# Patient Record
Sex: MALE | Race: WHITE | NOT HISPANIC OR LATINO | Employment: FULL TIME | URBAN - METROPOLITAN AREA
[De-identification: names, ages, dates, MRNs, and addresses within clinical notes are randomized per-mention and may not be internally consistent; named-entity substitution may affect disease eponyms.]

---

## 2017-10-23 ENCOUNTER — OFFICE VISIT (OUTPATIENT)
Dept: URGENT CARE | Facility: PHYSICIAN GROUP | Age: 27
End: 2017-10-23
Payer: COMMERCIAL

## 2017-10-23 VITALS
HEART RATE: 73 BPM | OXYGEN SATURATION: 98 % | TEMPERATURE: 97.9 F | WEIGHT: 264 LBS | RESPIRATION RATE: 16 BRPM | BODY MASS INDEX: 35.76 KG/M2 | SYSTOLIC BLOOD PRESSURE: 142 MMHG | HEIGHT: 72 IN | DIASTOLIC BLOOD PRESSURE: 108 MMHG

## 2017-10-23 DIAGNOSIS — J01.11 ACUTE RECURRENT FRONTAL SINUSITIS: Primary | ICD-10-CM

## 2017-10-23 PROCEDURE — 99213 OFFICE O/P EST LOW 20 MIN: CPT | Performed by: EMERGENCY MEDICINE

## 2017-10-23 RX ORDER — METHYLPREDNISOLONE 4 MG/1
TABLET ORAL
Qty: 1 KIT | Refills: 0 | Status: SHIPPED | OUTPATIENT
Start: 2017-10-23

## 2017-10-23 RX ORDER — FLUTICASONE PROPIONATE 50 MCG
1 SPRAY, SUSPENSION (ML) NASAL DAILY
COMMUNITY

## 2017-10-23 RX ORDER — LORATADINE 10 MG/1
10 TABLET ORAL DAILY
COMMUNITY

## 2017-10-23 RX ORDER — OLOPATADINE HYDROCHLORIDE 1 MG/ML
1 SOLUTION/ DROPS OPHTHALMIC 2 TIMES DAILY
Qty: 5 ML | Refills: 1 | Status: SHIPPED | OUTPATIENT
Start: 2017-10-23

## 2017-10-23 NOTE — PROGRESS NOTES
Subjective:      Georges Ibanez is a 27 y.o. male who presents with Nasal Congestion (allergies x3 weeks, worse past few days)            HPI  Patient patient with severe allergies for the past 3 weeks associated nasal congestion.Patient feels that this is seasonal but has not been recently tested in this locale. He has been tested in the past when he lived elsewhere. Patient complains of itchy eyes as well as runny nose with congestion.  No Known Allergies   Social History     Social History   • Marital status:      Spouse name: N/A   • Number of children: N/A   • Years of education: N/A     Occupational History   • Not on file.     Social History Main Topics   • Smoking status: Never Smoker   • Smokeless tobacco: Never Used   • Alcohol use Not on file   • Drug use: Unknown   • Sexual activity: Not on file     Other Topics Concern   • Not on file     Social History Narrative   • No narrative on file   History reviewed. No pertinent past medical history.   Current Outpatient Prescriptions on File Prior to Visit   Medication Sig Dispense Refill   • cephALEXin (KEFLEX) 500 MG Cap Take 1 Cap by mouth 3 times a day. 30 Cap 0     No current facility-administered medications on file prior to visit.    History reviewed. No pertinent family history.  Review of Systems   Constitutional: Negative for chills and fever.   HENT: Positive for congestion. Negative for sore throat.    Eyes: Positive for redness.   Respiratory: Negative for cough, hemoptysis, shortness of breath and stridor.    Cardiovascular: Negative for chest pain and palpitations.   Gastrointestinal: Negative for nausea and vomiting.   Musculoskeletal: Negative for neck pain.   Skin: Negative for rash.   Neurological: Negative for sensory change and speech change.   Psychiatric/Behavioral: The patient is not nervous/anxious.           Objective:     /108   Pulse 73   Temp 36.6 °C (97.9 °F)   Resp 16   Ht 1.829 m (6')   Wt 119.7 kg (264 lb)    SpO2 98%   BMI 35.80 kg/m²      Physical Exam   Constitutional: He appears well-developed and well-nourished. No distress.   HENT:   Head: Normocephalic and atraumatic.   Right Ear: External ear normal.   Left Ear: External ear normal.   Eyes: EOM are normal. Pupils are equal, round, and reactive to light. Right eye exhibits no discharge. Left eye exhibits no discharge.   Conjunctiva slightly inflamed. No exudates.   Neck: Normal range of motion.   Cardiovascular: Normal rate.    Pulmonary/Chest: No stridor. No respiratory distress. He has no wheezes. He has no rales.   Abdominal: He exhibits no distension. There is no tenderness.   Musculoskeletal: Normal range of motion.   Lymphadenopathy:     He has no cervical adenopathy.   Neurological: He is alert. No cranial nerve deficit.   Skin: Skin is warm. No rash noted. He is not diaphoretic. No erythema.   Psychiatric: He has a normal mood and affect. His behavior is normal.   Vitals reviewed.              Assessment/Plan:     Diagnosis allergic rhinitis/conjunctivitis      I am recommending the patient initiate/ continue hydration efforts including the use of a vaporizer/humidifier/ netti pot. I also recommend the pt, initiate Mucinex (if older than 4) Sudafed or Dayquil if not hypertensive. In addition the patient will initiate the prescribed prescription medication/s: Patanol all for his eyes Medrol Dosepak use as directed. If the patient's condition exacerbates with worsening dysphagia, shortness of breath, uncontrolled fever, headache or chest pressure he/she will return immediately to the urgent care or go to  the emergency department for further evaluation. Patient will follow up with PCP consider allergy testing. We will make sure he has the best filter is on a conditioning/heating system. I recommend something like [employer] filters.    Vernon Davis

## 2017-10-24 ASSESSMENT — ENCOUNTER SYMPTOMS
HEMOPTYSIS: 0
PALPITATIONS: 0
NERVOUS/ANXIOUS: 0
SPEECH CHANGE: 0
SHORTNESS OF BREATH: 0
NAUSEA: 0
COUGH: 0
NECK PAIN: 0
CHILLS: 0
STRIDOR: 0
EYE REDNESS: 1
SORE THROAT: 0
SENSORY CHANGE: 0
FEVER: 0
VOMITING: 0

## 2017-12-25 ENCOUNTER — OFFICE VISIT (OUTPATIENT)
Dept: URGENT CARE | Facility: PHYSICIAN GROUP | Age: 27
End: 2017-12-25
Payer: COMMERCIAL

## 2017-12-25 VITALS
SYSTOLIC BLOOD PRESSURE: 120 MMHG | TEMPERATURE: 99.3 F | DIASTOLIC BLOOD PRESSURE: 86 MMHG | WEIGHT: 249 LBS | HEIGHT: 72 IN | OXYGEN SATURATION: 93 % | HEART RATE: 110 BPM | RESPIRATION RATE: 14 BRPM | BODY MASS INDEX: 33.72 KG/M2

## 2017-12-25 DIAGNOSIS — Z20.828 EXPOSURE TO THE FLU: ICD-10-CM

## 2017-12-25 DIAGNOSIS — R68.89 FLU-LIKE SYMPTOMS: ICD-10-CM

## 2017-12-25 PROCEDURE — 99214 OFFICE O/P EST MOD 30 MIN: CPT | Performed by: FAMILY MEDICINE

## 2017-12-25 RX ORDER — GUAIFENESIN 600 MG/1
600 TABLET, EXTENDED RELEASE ORAL EVERY 12 HOURS
COMMUNITY

## 2017-12-25 RX ORDER — OSELTAMIVIR PHOSPHATE 75 MG/1
75 CAPSULE ORAL 2 TIMES DAILY
Qty: 10 CAP | Refills: 0 | Status: SHIPPED | OUTPATIENT
Start: 2017-12-25

## 2017-12-25 ASSESSMENT — ENCOUNTER SYMPTOMS
MYALGIAS: 1
FEVER: 1
COUGH: 1
CHILLS: 1

## 2017-12-25 ASSESSMENT — PAIN SCALES - GENERAL: PAINLEVEL: NO PAIN

## 2017-12-25 NOTE — PROGRESS NOTES
Subjective:   Georges Ibanez is a 27 y.o. male who presents for Flu Like Symptoms (x 24 hours)        Influenza   This is a new problem. The current episode started yesterday. The problem occurs constantly. The problem has been rapidly worsening. Associated symptoms include chills, coughing, a fever and myalgias.     Review of Systems   Constitutional: Positive for chills and fever.   Respiratory: Positive for cough.    Musculoskeletal: Positive for myalgias.     No Known Allergies   Objective:   /86   Pulse (!) 110   Temp 37.4 °C (99.3 °F)   Resp 14   Ht 1.829 m (6')   Wt 112.9 kg (249 lb)   SpO2 93%   BMI 33.77 kg/m²   Physical Exam   Constitutional: He is oriented to person, place, and time. He appears well-developed and well-nourished. No distress.   HENT:   Head: Normocephalic and atraumatic.   Mouth/Throat: Uvula is midline. Posterior oropharyngeal edema and posterior oropharyngeal erythema present. No oropharyngeal exudate or tonsillar abscesses.   Eyes: Conjunctivae and EOM are normal. Pupils are equal, round, and reactive to light.   Cardiovascular: Normal rate, regular rhythm and intact distal pulses.    No murmur heard.  Pulmonary/Chest: Effort normal and breath sounds normal. No respiratory distress.   Abdominal: Soft. He exhibits no distension. There is no tenderness.   Neurological: He is alert and oriented to person, place, and time. He has normal reflexes. No sensory deficit.   Skin: Skin is warm and dry.   Psychiatric: He has a normal mood and affect. His behavior is normal.   Vitals reviewed.        Assessment/Plan:   Assessment    1. Flu-like symptoms  Differential diagnosis, natural history, supportive care, and indications for immediate follow-up discussed.   - oseltamivir (TAMIFLU) 75 MG Cap; Take 1 Cap by mouth 2 times a day.  Dispense: 10 Cap; Refill: 0    2. Exposure to the flu

## 2019-11-19 ENCOUNTER — OFFICE VISIT (OUTPATIENT)
Dept: URGENT CARE | Facility: PHYSICIAN GROUP | Age: 29
End: 2019-11-19
Payer: COMMERCIAL

## 2019-11-19 VITALS
HEIGHT: 71 IN | HEART RATE: 92 BPM | TEMPERATURE: 98.2 F | WEIGHT: 214 LBS | OXYGEN SATURATION: 98 % | DIASTOLIC BLOOD PRESSURE: 68 MMHG | BODY MASS INDEX: 29.96 KG/M2 | SYSTOLIC BLOOD PRESSURE: 122 MMHG | RESPIRATION RATE: 18 BRPM

## 2019-11-19 DIAGNOSIS — B34.9 VIRAL ILLNESS: ICD-10-CM

## 2019-11-19 LAB
FLUAV+FLUBV AG SPEC QL IA: NEGATIVE
INT CON NEG: NEGATIVE
INT CON POS: POSITIVE

## 2019-11-19 PROCEDURE — 87804 INFLUENZA ASSAY W/OPTIC: CPT | Performed by: FAMILY MEDICINE

## 2019-11-19 PROCEDURE — 99213 OFFICE O/P EST LOW 20 MIN: CPT | Performed by: FAMILY MEDICINE

## 2019-11-19 SDOH — HEALTH STABILITY: MENTAL HEALTH: HOW OFTEN DO YOU HAVE A DRINK CONTAINING ALCOHOL?: NEVER

## 2019-11-19 ASSESSMENT — ENCOUNTER SYMPTOMS
MYALGIAS: 0
DIZZINESS: 0
SHORTNESS OF BREATH: 0
SORE THROAT: 0
FEVER: 0
EYE PAIN: 0
CHILLS: 0
VOMITING: 0
NAUSEA: 0

## 2019-11-19 NOTE — LETTER
November 19, 2019         Patient: Georges Ibanez   YOB: 1990   Date of Visit: 11/19/2019           To Whom it May Concern:    Georges Ibanez was seen in my clinic on 11/19/2019. He may return to work on 11/21/2019..    If you have any questions or concerns, please don't hesitate to call.        Sincerely,           Clark Kent M.D.  Electronically Signed

## 2019-11-20 NOTE — PROGRESS NOTES
"Subjective:   Georges Ibanez is a 29 y.o. male who presents for Sinus Problem (x this morning, abd pain and body aches )        29-year-old male presents the urgent care with chief complaint of body aches, myalgia, upset stomach, loose stools onset today and is sudden onset.  Complains of subjective fever and mild chills.  Patient denies flu vaccination this season.    Sinus Problem   Pertinent negatives include no chills, shortness of breath or sore throat.     Review of Systems   Constitutional: Negative for chills and fever.   HENT: Negative for sore throat.    Eyes: Negative for pain.   Respiratory: Negative for shortness of breath.    Cardiovascular: Negative for chest pain.   Gastrointestinal: Negative for nausea and vomiting.   Genitourinary: Negative for hematuria.   Musculoskeletal: Negative for myalgias.   Skin: Negative for rash.   Neurological: Negative for dizziness.     No Known Allergies   Objective:   /68   Pulse 92   Temp 36.8 °C (98.2 °F) (Temporal)   Resp 18   Ht 1.803 m (5' 11\")   Wt 97.1 kg (214 lb)   SpO2 98%   BMI 29.85 kg/m²   Physical Exam  Constitutional:       General: He is not in acute distress.     Appearance: He is well-developed.   HENT:      Head: Normocephalic and atraumatic.      Nose: Mucosal edema and rhinorrhea present.      Right Turbinates: Swollen.      Left Turbinates: Swollen.      Mouth/Throat:      Pharynx: Posterior oropharyngeal erythema present.   Eyes:      Conjunctiva/sclera: Conjunctivae normal.      Pupils: Pupils are equal, round, and reactive to light.   Cardiovascular:      Rate and Rhythm: Normal rate and regular rhythm.      Heart sounds: No murmur.   Pulmonary:      Effort: Pulmonary effort is normal. No respiratory distress.      Breath sounds: Normal breath sounds.   Abdominal:      General: There is no distension.      Palpations: Abdomen is soft.      Tenderness: There is no tenderness.   Musculoskeletal: Normal range of motion.   Skin:   "   General: Skin is warm and dry.   Neurological:      General: No focal deficit present.      Mental Status: He is alert and oriented to person, place, and time. Mental status is at baseline.      Gait: Gait (gait at baseline) normal.   Psychiatric:         Judgment: Judgment normal.     POCT influenza: negative         Assessment/Plan:   1. Viral illness  - POCT Influenza A/B    Differential diagnosis, natural history, supportive care, and indications for immediate follow-up discussed.     Advised the patient to follow-up with the primary care physician for recheck, reevaluation, and consideration of further management.

## 2023-04-20 RX ORDER — TRAMADOL HYDROCHLORIDE 50 MG/1
1 TABLET ORAL EVERY 12 HOURS PRN
COMMUNITY
Start: 2023-04-17 | End: 2023-05-02

## 2023-04-20 RX ORDER — ONDANSETRON 4 MG/1
1 TABLET, FILM COATED ORAL EVERY 8 HOURS PRN
COMMUNITY
Start: 2023-04-17

## 2023-04-20 RX ORDER — HYDROCODONE BITARTRATE AND ACETAMINOPHEN 5; 325 MG/1; MG/1
1-2 TABLET ORAL EVERY 6 HOURS PRN
COMMUNITY
Start: 2023-04-19

## 2023-04-23 ENCOUNTER — TELEMEDICINE (OUTPATIENT)
Dept: TELEHEALTH | Age: 33
End: 2023-04-23

## 2023-04-23 DIAGNOSIS — Z01.818 PREOPERATIVE CLEARANCE: Primary | ICD-10-CM

## 2023-04-23 NOTE — PROGRESS NOTES
Patient scheduled a Video Visit on Demand for a COVID test order. I called the patient who explained that he is scheduled for a microdiscectomy on 4/26/23 and was told that he would be contacted to have a COVID test performed within 72 hours before his Surgery. Since this was within that timeframe, the patient states he became concerned because he was never contacted. Patient does not anticipate being admitted after the procedure. I advised the patient that we have been updated on the Hospital's COVID testing policies prior to outpatient surgeries and this is no longer routinely required. Patient is concerned because he was specifically told by the office that he would need COVID testing. Patient does not want to chance jeopardizing his chance for the Surgery to take place on 4/26/23. We agreed that I would place the order which the patient could schedule through Central Scheduling tomorrow if he did not receive confirmation from the Surgeon's office that this was not going to be required. I advised that patient that I would not charge him for a Video Visit under the circumstances. COVID ([facility]) PCR test ordered.

## 2023-04-25 ENCOUNTER — ANESTHESIA EVENT (OUTPATIENT)
Dept: SURGERY | Facility: HOSPITAL | Age: 33
End: 2023-04-25
Payer: COMMERCIAL

## 2023-04-26 ENCOUNTER — APPOINTMENT (OUTPATIENT)
Dept: GENERAL RADIOLOGY | Facility: HOSPITAL | Age: 33
End: 2023-04-26
Attending: ORTHOPAEDIC SURGERY
Payer: COMMERCIAL

## 2023-04-26 ENCOUNTER — HOSPITAL ENCOUNTER (OUTPATIENT)
Facility: HOSPITAL | Age: 33
Setting detail: HOSPITAL OUTPATIENT SURGERY
Discharge: HOME | End: 2023-04-26
Attending: ORTHOPAEDIC SURGERY | Admitting: ORTHOPAEDIC SURGERY
Payer: COMMERCIAL

## 2023-04-26 ENCOUNTER — ANESTHESIA (OUTPATIENT)
Dept: SURGERY | Facility: HOSPITAL | Age: 33
End: 2023-04-26
Payer: COMMERCIAL

## 2023-04-26 VITALS
HEIGHT: 72 IN | HEART RATE: 93 BPM | OXYGEN SATURATION: 96 % | BODY MASS INDEX: 35.21 KG/M2 | DIASTOLIC BLOOD PRESSURE: 87 MMHG | SYSTOLIC BLOOD PRESSURE: 138 MMHG | TEMPERATURE: 98 F | RESPIRATION RATE: 16 BRPM | WEIGHT: 260 LBS

## 2023-04-26 PROCEDURE — 88311 DECALCIFY TISSUE: CPT | Performed by: ORTHOPAEDIC SURGERY

## 2023-04-26 PROCEDURE — 0ST40ZZ RESECTION OF LUMBOSACRAL DISC, OPEN APPROACH: ICD-10-PCS | Performed by: ORTHOPAEDIC SURGERY

## 2023-04-26 PROCEDURE — 88304 TISSUE EXAM BY PATHOLOGIST: CPT | Performed by: ORTHOPAEDIC SURGERY

## 2023-04-26 PROCEDURE — 72020 X-RAY EXAM OF SPINE 1 VIEW: CPT | Performed by: ORTHOPAEDIC SURGERY

## 2023-04-26 DEVICE — PATCH DURAL DURAMATRIX 1X1: Type: IMPLANTABLE DEVICE | Site: BACK | Status: FUNCTIONAL

## 2023-04-26 RX ORDER — CEFAZOLIN SODIUM/WATER 2 G/20 ML
2 SYRINGE (ML) INTRAVENOUS ONCE
Status: COMPLETED | OUTPATIENT
Start: 2023-04-26 | End: 2023-04-26

## 2023-04-26 RX ORDER — GLYCOPYRROLATE 0.2 MG/ML
INJECTION, SOLUTION INTRAMUSCULAR; INTRAVENOUS AS NEEDED
Status: DISCONTINUED | OUTPATIENT
Start: 2023-04-26 | End: 2023-04-26 | Stop reason: SURG

## 2023-04-26 RX ORDER — ONDANSETRON 2 MG/ML
4 INJECTION INTRAMUSCULAR; INTRAVENOUS EVERY 6 HOURS PRN
Status: DISCONTINUED | OUTPATIENT
Start: 2023-04-26 | End: 2023-04-26

## 2023-04-26 RX ORDER — NALOXONE HYDROCHLORIDE 0.4 MG/ML
80 INJECTION, SOLUTION INTRAMUSCULAR; INTRAVENOUS; SUBCUTANEOUS AS NEEDED
Status: DISCONTINUED | OUTPATIENT
Start: 2023-04-26 | End: 2023-04-26

## 2023-04-26 RX ORDER — DEXAMETHASONE SODIUM PHOSPHATE 4 MG/ML
VIAL (ML) INJECTION AS NEEDED
Status: DISCONTINUED | OUTPATIENT
Start: 2023-04-26 | End: 2023-04-26 | Stop reason: SURG

## 2023-04-26 RX ORDER — MIDAZOLAM HYDROCHLORIDE 1 MG/ML
1 INJECTION INTRAMUSCULAR; INTRAVENOUS EVERY 5 MIN PRN
Status: DISCONTINUED | OUTPATIENT
Start: 2023-04-26 | End: 2023-04-26

## 2023-04-26 RX ORDER — HYDROCODONE BITARTRATE AND ACETAMINOPHEN 5; 325 MG/1; MG/1
1 TABLET ORAL ONCE AS NEEDED
Status: COMPLETED | OUTPATIENT
Start: 2023-04-26 | End: 2023-04-26

## 2023-04-26 RX ORDER — ROCURONIUM BROMIDE 10 MG/ML
INJECTION, SOLUTION INTRAVENOUS AS NEEDED
Status: DISCONTINUED | OUTPATIENT
Start: 2023-04-26 | End: 2023-04-26 | Stop reason: SURG

## 2023-04-26 RX ORDER — ONDANSETRON 2 MG/ML
INJECTION INTRAMUSCULAR; INTRAVENOUS AS NEEDED
Status: DISCONTINUED | OUTPATIENT
Start: 2023-04-26 | End: 2023-04-26 | Stop reason: SURG

## 2023-04-26 RX ORDER — ONDANSETRON 2 MG/ML
4 INJECTION INTRAMUSCULAR; INTRAVENOUS ONCE
Status: COMPLETED | OUTPATIENT
Start: 2023-04-26 | End: 2023-04-26

## 2023-04-26 RX ORDER — TIZANIDINE 2 MG/1
2 TABLET ORAL EVERY 6 HOURS PRN
COMMUNITY
Start: 2023-04-18

## 2023-04-26 RX ORDER — PROCHLORPERAZINE EDISYLATE 5 MG/ML
5 INJECTION INTRAMUSCULAR; INTRAVENOUS EVERY 8 HOURS PRN
Status: DISCONTINUED | OUTPATIENT
Start: 2023-04-26 | End: 2023-04-26

## 2023-04-26 RX ORDER — CELECOXIB 200 MG/1
200 CAPSULE ORAL ONCE
Status: COMPLETED | OUTPATIENT
Start: 2023-04-26 | End: 2023-04-26

## 2023-04-26 RX ORDER — SODIUM CHLORIDE, SODIUM LACTATE, POTASSIUM CHLORIDE, CALCIUM CHLORIDE 600; 310; 30; 20 MG/100ML; MG/100ML; MG/100ML; MG/100ML
INJECTION, SOLUTION INTRAVENOUS CONTINUOUS
Status: DISCONTINUED | OUTPATIENT
Start: 2023-04-26 | End: 2023-04-26

## 2023-04-26 RX ORDER — HYDROMORPHONE HYDROCHLORIDE 1 MG/ML
0.4 INJECTION, SOLUTION INTRAMUSCULAR; INTRAVENOUS; SUBCUTANEOUS EVERY 5 MIN PRN
Status: DISCONTINUED | OUTPATIENT
Start: 2023-04-26 | End: 2023-04-26

## 2023-04-26 RX ORDER — ACETAMINOPHEN 500 MG
1000 TABLET ORAL ONCE AS NEEDED
Status: COMPLETED | OUTPATIENT
Start: 2023-04-26 | End: 2023-04-26

## 2023-04-26 RX ORDER — CEFAZOLIN SODIUM/WATER 2 G/20 ML
SYRINGE (ML) INTRAVENOUS
Status: DISCONTINUED
Start: 2023-04-26 | End: 2023-04-26

## 2023-04-26 RX ORDER — SCOLOPAMINE TRANSDERMAL SYSTEM 1 MG/1
1 PATCH, EXTENDED RELEASE TRANSDERMAL ONCE
Status: DISCONTINUED | OUTPATIENT
Start: 2023-04-26 | End: 2023-04-26 | Stop reason: HOSPADM

## 2023-04-26 RX ORDER — HYDROMORPHONE HYDROCHLORIDE 1 MG/ML
0.6 INJECTION, SOLUTION INTRAMUSCULAR; INTRAVENOUS; SUBCUTANEOUS EVERY 5 MIN PRN
Status: DISCONTINUED | OUTPATIENT
Start: 2023-04-26 | End: 2023-04-26

## 2023-04-26 RX ORDER — PROCHLORPERAZINE EDISYLATE 5 MG/ML
INJECTION INTRAMUSCULAR; INTRAVENOUS
Status: COMPLETED
Start: 2023-04-26 | End: 2023-04-26

## 2023-04-26 RX ORDER — LIDOCAINE HYDROCHLORIDE 10 MG/ML
INJECTION, SOLUTION EPIDURAL; INFILTRATION; INTRACAUDAL; PERINEURAL AS NEEDED
Status: DISCONTINUED | OUTPATIENT
Start: 2023-04-26 | End: 2023-04-26 | Stop reason: SURG

## 2023-04-26 RX ORDER — HYDROCODONE BITARTRATE AND ACETAMINOPHEN 5; 325 MG/1; MG/1
2 TABLET ORAL ONCE AS NEEDED
Status: COMPLETED | OUTPATIENT
Start: 2023-04-26 | End: 2023-04-26

## 2023-04-26 RX ORDER — ACETAMINOPHEN 500 MG
1000 TABLET ORAL ONCE
Status: DISCONTINUED | OUTPATIENT
Start: 2023-04-26 | End: 2023-04-26 | Stop reason: HOSPADM

## 2023-04-26 RX ORDER — MEPERIDINE HYDROCHLORIDE 25 MG/ML
12.5 INJECTION INTRAMUSCULAR; INTRAVENOUS; SUBCUTANEOUS AS NEEDED
Status: DISCONTINUED | OUTPATIENT
Start: 2023-04-26 | End: 2023-04-26

## 2023-04-26 RX ORDER — HYDROMORPHONE HYDROCHLORIDE 1 MG/ML
0.2 INJECTION, SOLUTION INTRAMUSCULAR; INTRAVENOUS; SUBCUTANEOUS EVERY 5 MIN PRN
Status: DISCONTINUED | OUTPATIENT
Start: 2023-04-26 | End: 2023-04-26

## 2023-04-26 RX ORDER — DIPHENHYDRAMINE HYDROCHLORIDE 50 MG/ML
12.5 INJECTION INTRAMUSCULAR; INTRAVENOUS AS NEEDED
Status: DISCONTINUED | OUTPATIENT
Start: 2023-04-26 | End: 2023-04-26

## 2023-04-26 RX ORDER — LIDOCAINE HYDROCHLORIDE 40 MG/ML
INJECTION, SOLUTION RETROBULBAR; TOPICAL AS NEEDED
Status: DISCONTINUED | OUTPATIENT
Start: 2023-04-26 | End: 2023-04-26 | Stop reason: SURG

## 2023-04-26 RX ORDER — NEOSTIGMINE METHYLSULFATE 1 MG/ML
INJECTION, SOLUTION INTRAVENOUS AS NEEDED
Status: DISCONTINUED | OUTPATIENT
Start: 2023-04-26 | End: 2023-04-26 | Stop reason: SURG

## 2023-04-26 RX ORDER — MELOXICAM 15 MG/1
TABLET ORAL
COMMUNITY
Start: 2023-04-25

## 2023-04-26 RX ORDER — ONDANSETRON 2 MG/ML
INJECTION INTRAMUSCULAR; INTRAVENOUS
Status: COMPLETED
Start: 2023-04-26 | End: 2023-04-26

## 2023-04-26 RX ORDER — CELECOXIB 200 MG/1
CAPSULE ORAL
Status: COMPLETED
Start: 2023-04-26 | End: 2023-04-26

## 2023-04-26 RX ADMIN — CEFAZOLIN SODIUM/WATER 2 G: 2 G/20 ML SYRINGE (ML) INTRAVENOUS at 13:12:00

## 2023-04-26 RX ADMIN — LIDOCAINE HYDROCHLORIDE 50 MG: 10 INJECTION, SOLUTION EPIDURAL; INFILTRATION; INTRACAUDAL; PERINEURAL at 12:58:00

## 2023-04-26 RX ADMIN — SODIUM CHLORIDE, SODIUM LACTATE, POTASSIUM CHLORIDE, CALCIUM CHLORIDE: 600; 310; 30; 20 INJECTION, SOLUTION INTRAVENOUS at 12:52:00

## 2023-04-26 RX ADMIN — NEOSTIGMINE METHYLSULFATE 3 MG: 1 INJECTION, SOLUTION INTRAVENOUS at 14:10:00

## 2023-04-26 RX ADMIN — GLYCOPYRROLATE 0.4 MG: 0.2 INJECTION, SOLUTION INTRAMUSCULAR; INTRAVENOUS at 14:10:00

## 2023-04-26 RX ADMIN — DEXAMETHASONE SODIUM PHOSPHATE 8 MG: 4 MG/ML VIAL (ML) INJECTION at 13:03:00

## 2023-04-26 RX ADMIN — LIDOCAINE HYDROCHLORIDE 4 ML: 40 INJECTION, SOLUTION RETROBULBAR; TOPICAL at 12:59:00

## 2023-04-26 RX ADMIN — ONDANSETRON 4 MG: 2 INJECTION INTRAMUSCULAR; INTRAVENOUS at 13:17:00

## 2023-04-26 RX ADMIN — SODIUM CHLORIDE, SODIUM LACTATE, POTASSIUM CHLORIDE, CALCIUM CHLORIDE: 600; 310; 30; 20 INJECTION, SOLUTION INTRAVENOUS at 14:11:00

## 2023-04-26 RX ADMIN — ROCURONIUM BROMIDE 50 MG: 10 INJECTION, SOLUTION INTRAVENOUS at 12:58:00

## 2023-04-26 NOTE — H&P
Agree with below. Patient would like to proceed with surgery. Raymundo Bundy PA-C  [address] [facility]-Internal Medicine  Attending Physician Office Visit Note    Chief Complaint   Patient presents with:  Pre-Op Exam: Lumbar 5- sacral 1 microdiscectomy with Dr. Jenniffer Painter on 4/26/23    History of Present Illness:  Monica Carr is 27year old male with a past medical history as below who presents with the following:     No reactions to anesthesia in past. No known significant reactions to anesthesia in her family. No VTE or in VTE in family. No easy bleeding or bruising. No angina or chest pain, SOB, dyspnea on exertion, syncope, pre-syncope, palpitations, orthopnea, PND, leg swelling. Exercise tolerance 2+ blocks without stopping. Elevated BP: /100, likely due to pain    No fevers, chills, headache, vision changes, double vision, dizziness, pre-syncope, CP, SOB, abd pain, NV, diarrhea, melena, hematochezia, hematuria, dysuria. Medications:  HYDROcodone-acetaminophen 5-325 MG Oral Tab, Take 1-2 tablets by mouth every 6 (six) hours as needed for Pain., Disp: 30 tablet, Rfl: 0  tiZANidine 2 MG Oral Tab, Take 1 tablet (2 mg total) by mouth every 6 (six) hours as needed. , Disp: 30 tablet, Rfl: 0  traMADol 50 MG Oral Tab, Take 1 tablet (50 mg total) by mouth every 12 (twelve) hours as needed for Pain., Disp: 30 tablet, Rfl: 0  ondansetron (ZOFRAN) 4 mg tablet, Take 1 tablet (4 mg total) by mouth every 8 (eight) hours as needed for Nausea., Disp: 20 tablet, Rfl: 0  Fexofenadine HCl (ALLEGRA OR), , Disp: , Rfl:   Meloxicam 15 MG Oral Tab, Take 1 tablet (15 mg total) by mouth daily. (Patient not taking: Reported on 4/20/2023), Disp: 30 tablet, Rfl: 1    No current facility-administered medications on file prior to visit. Problem List:  There is no problem list on file for this patient. Past Medical History:  No past medical history on file.     Past Surgical History:  No past surgical history on file. Allergies:  No Known Allergies    Social History:  Social History  Socioeconomic History  Marital status:   Tobacco Use  Smoking status: Never  Smokeless tobacco: Never  Vaping Use  Vaping Use: Never used  Substance and Sexual Activity  Alcohol use: Not Currently  Drug use: Never  Sexual activity: Yes      Family History:  No family history on file. Review of Systems:  General: Denies fevers or weight loss  HENT: Denies headache, hearing changes, or sore throat  Eyes: Denies any vision changes  Lungs: Denies any cough, shortness of breath, or wheezing  Heart: Denies any palpitations or chest pain. Denies orthopnea or leg swelling. Abdomen: Denies any abdominal pain, nausea, vomiting, diarrhea, constipation, or bloody/melena stools. Gu: Denies any pain or difficulty with urination  MSK: Denies any joint or muscle pain  Skin: Denies any rashes  Neuro: Denies any focal weakness or numbness/tingling  Psych: Denies any HI/SI.      Physical Exam:  04/20/23  0947   BP: (!) 134/100   Pulse: 85   SpO2: 96%   Weight: 255 lb (115.7 kg)   Height: 72\"     General: NAD, appears stated age, appears in pain, wheel chair bound  HENT: NCAT  Eyes: Normal conjunctiva, no scleral icterus  Lungs: CTAB, not in respiratory distress  Heart: RRR, S1/S2 present, no murmurs, no LE edema  MSK: Full ROM in UE and LE  Neuro: A&Ox3, moving all extremities  Skin: No rashes on exposed areas  Psych: Appropriate affect, euthymic    Labs/Imaging:  CBC: No results found for: [facility] results found for: HEMOGLOBIN, HGB No results found for: PLT   BMP: No results found for: GLUCOSENo results found for: KNo results found for: BUNNo results found for: CREATSERUM  Cholesterol: No results found for: CHOLESTNo results found for: HDLNo results found for: TRIG, TRIGLYNo results found for: LDLNo results found for: ASTNo results found for: ALT   Diabetes: No results found for: A1C, EAGNo results found for: CHOLESTNo results found for: HDLNo results found for: LDLNo results found for: Lenette Jaquan results found for: ASTNo results found for: ALT   Micro Albumen/Creatinine: No results found for: MICROALBCREA, MALBCREACALC   No results found for: PT, INR  INR: No results found for: INR  Ua: Imaging:  XR LUMBAR SPINE (MIN 4 VIEWS) (CPT=72110)    Result Date: 3/22/2023  IMPRESSION: 1. Mild dextrocurvature of the lumbar spine and straightening of normal lumbar lordosis, with an age-indeterminate superior L1 compression deformity (25% loss of vertebral body height) and mild loss of disc height at L5-S1. 2. Mild L5-S1 facet arthropathy and bilateral SI joint arthritis withOUT aggressive osseous lesions or pars defects. 3. Recommend further assessment with MR lumbar spine. MRI SPINE LUMBAR (CPT=72148)    Result Date: 3/30/2023  IMPRESSION: Disc herniation at L5-S1 with mass effect on the traversing left S1 nerve root. Correlate for corresponding radiculopathy. Other minimal degenerative changes as above. Minimal anterior wedging of the L1 vertebral body is likely degenerative. Assessment and Plan:  Lizzy Collazo is 27year old male with a past medical history as above who presents with the followin. Preoperative cardiovascular examination  -risk stratification below  - COMPREHENSIVE METABOLIC PANEL; Future  - COMPLETE BLOOD COUNT (CBC) WITHOUT DIFFERENTIAL; Future  - PT AND PTT; Future  - URINALYSIS, COMPLETE WITH MICROSCOPIC EXAMINATION WITH REFLEX TO URINE CULTURE, ROUTINE; Future  - ELECTROCARDIOGRAM, COMPLETE  - MRSA / MSSA PRE-OP; Future; this is medically necessary  - OFFICE CONSULTATION,LEVEL III    2. Primary hypertension  -/100, low salt diet, likely pain is contributing  -diet controlled    3.  Class 1 obesity due to excess calories with serious comorbidity and body mass index (BMI) of 34.0 to 34.9 in adult  -discuss diet and exercise    Procedure: Lumbar 5- sacral 1 microdiscectomy   Cardiac Risk:  Patient is low risk (RCRI = 0) for intermediate risk procedure. No chest pain or symptoms concerning for ACS. Functional Capacity is 4+ METs   EKG shows: NSR, borderline LVH by lead I, no specific ST     VTE risk: CAPRINI score: 3 (moderate): SCDs post-operatively, resume chemo VTE prophylaxis when able    Pulmonary Risk: ARISCAT 0 (low risk)  -perioperative pulmonary complications including hypoxemia, atelectasis, pneumonia, respiratory failure. -frequent incentive spirometry post-operatively as tolerated. FARHAT risk: FARHAT leads to possible difficult intubation risk and respiratory failure. STOPBANG 4 (intermediate risk); avoid oversedating medications    Renal: no sig hx of renal dysfunction. CMP ordered. Heme: no sig hx of significant anemia. CBC ordered. Endo: no hx of diabetes. Medication management: hold Aspirin 7 days prior to surgery. Hold ACE and ARB day of surgery. Continue rest of medications. Per ACC/ AHA guidelines, this patient may proceed to surgery without further risk stratification. I reviewed the chart, history, patient symptoms, underlying risk factors, medical conditions, diagnosis, formulation of treatment plan, and initiation of medical therapy during this visit. Prescription drug management was provided during this visit. Education was provided and was given to patient in the After Visit Summary.     Katya Cee DO

## 2023-04-26 NOTE — BRIEF OP NOTE
Pre-Operative Diagnosis: DISC HERNIATION     Post-Operative Diagnosis: One [facility] HERNIATION      Procedure Performed:   LEFT MICRODISCECTOMY LUMBAR 5 - SACRUM 1    Surgeon(s) and Role:     * Ed Zabala MD - Primary    Assistant(s):  PA: BLANCA Ornelas     Surgical Findings: above     Specimen: L5-S1 HNp     Estimated Blood Loss: Blood Output: 20 mL (4/26/2023  1:55 PM)      Dictation Number:      BLANCA Rodriguez  4/26/2023  2:12 PM

## 2023-04-26 NOTE — ANESTHESIA PROCEDURE NOTES
Airway  Date/Time: 4/26/2023 1:00 PM  Urgency: elective      General Information and Staff    Patient location during procedure: OR  Anesthesiologist: William Cardoza MD  Resident/CRNA: Ean Engle CRNA  Performed: CRNA   Performed by: Ean Engle CRNA  Authorized by: William Cardoza MD      Indications and Patient Condition  Indications for airway management: anesthesia  Sedation level: deep  Preoxygenated: yes  Patient position: sniffing  Mask difficulty assessment: 1 - vent by mask    Final Airway Details  Final airway type: endotracheal airway      Successful airway: ETT  Cuffed: yes   Successful intubation technique: direct laryngoscopy  Endotracheal tube insertion site: oral  Blade: GlideScope  Blade size: #4  ETT size (mm): 7.5    Cormack-Lehane Classification: grade I - full view of glottis  Placement verified by: chest auscultation and capnometry   Measured from: lips  ETT to lips (cm): 23  Number of attempts at approach: 1

## 2023-04-26 NOTE — ANESTHESIA POSTPROCEDURE EVALUATION
[address] Patient Status:  Hospital Outpatient Surgery   Age/Gender 27year old male MRN [id number]   Location [facility] Attending Ernst Longoria MD   Hosp Day # 0 PCP Armando Duran DO       Anesthesia Post-op Note    LEFT MICRODISCECTOMY LUMBAR 5 - SACRUM 1    Procedure Summary     Date: 04/26/23 Room / Location: [facility] MAIN OR 10 / [facility] OR    Anesthesia Start: 6378 Anesthesia Stop: 3223    Procedure: LEFT MICRODISCECTOMY LUMBAR 5 - SACRUM 1 (Spine Lumbar) Diagnosis: (One [address] HERNIATION)    Surgeons: Ernst Longoria MD Anesthesiologist: Blake Vazquez MD    Anesthesia Type: general ASA Status: 2          Anesthesia Type: general    Vitals Value Taken Time   /77 04/26/23 1426   Temp 97.8 04/26/23 1426   Pulse 69 04/26/23 1426   Resp 18 04/26/23 1426   SpO2 98% 04/26/23 1426       Patient Location: PACU    Anesthesia Type: general    Airway Patency: patent    Postop Pain Control: adequate    Mental Status: mildly sedated but able to meaningfully participate in the post-anesthesia evaluation    Nausea/Vomiting: none    Cardiopulmonary/Hydration status: stable euvolemic    Complications: no apparent anesthesia related complications    Postop vital signs: stable    Dental Exam: Unchanged from Preop    Patient to be discharged from PACU when criteria met.

## 2023-04-27 NOTE — OPERATIVE REPORT
[facility]    PATIENT'S NAME: Mona Ewing   ATTENDING PHYSICIAN: Camilo Beauchamp M.D. OPERATING PHYSICIAN: Camilo Beauchamp M.D. PATIENT ACCOUNT#:   [de-identified]    LOCATION:  [facility] EDWP 10  MEDICAL RECORD #:   [id number]       YOB: 1990  ADMISSION DATE:       04/26/2023      OPERATION DATE:  04/26/2023    OPERATIVE REPORT      PREOPERATIVE DIAGNOSIS:  L5-S1 disc herniation, morbid obesity, BMI of 35. POSTOPERATIVE DIAGNOSIS:  L5-S1 disc herniation, morbid obesity, BMI of 35. PROCEDURE PERFORMED:  L5-S1 microdecompression discectomy. INDICATIONS:  The patient had failed reasonable conservative measures which are outlined in the patient's clinic medical record. Physical therapy, medical management, injections, alternative treatment are among those offered unless motor deficits are progressive. Indications for surgery are based on scientific literature and JEZ guidelines. A thorough meeting with the patient and at least one key caregiver was had. The risks and benefits were discussed in significant detail. The risks include, but are not limited to, bleeding, infection, spinal leaks, nerve injuries, recurrent disc herniation, lumbar instability, and need for further surgery. I have gone over the operation using models, diagrams, and the patient's own imaging studies. Additional written and digital sources were provided. No guarantees were made. ASSISTANT:  Kiara Solares PA-C. A skilled and experienced assistant was required for the entire surgical procedure. As a lumbar spinal reconstruction, the procedure is done in immediate proximity to critical neural elements and is done in close proximity to the critical vascular and visceral structures. Much of the surgery is done in and around the cauda equine and its exiting nerves.   Any assistance, including, but not limited to, retraction, suction, and other means of facilitation of the procedure requires an individual with substantial postgraduate training and substantial spinal surgical experience. ESTIMATED BLOOD LOSS:  20 mL. SPECIMENS:  L5-S1 HNP. DESCRIPTION OF PROCEDURE:  After obtaining informed consent, the patient was taken to the operating room. SCDs and HERMINIO hose were applied. Preoperative antibiotics were delivered. The patient was placed in the prone position on a [employer], [place] frame. The patient's increased BMI resulted in additional increased bleeding and time utilized for hemostasis and also required specialized retractors and additional x-ray time. Throughout the case, because of decreased visualization as a result of the patient's large body mass, additional time and expertise was required. Additionally, Sapna's plexus was engorged because of venous back pressure as a result of the patient's abdominal obesity. This impaired visualization, making the procedure more difficult as well. All bony prominences were padded. The back was sterilely prepped and draped after an incision had been marked and confirmed using x-ray with a sterilely applied needle. The back was sterilely prepped and draped, and a longitudinal incision was made with a #10 blade at the L5-S1 levels. Bovie electrocautery was used for hemostasis. Dissection was taken down to the level of the fascia. Subperiosteal dissection was taken at all levels outlined above. Self-retaining retractors were applied. A curette was used to gain access to the canal and to free ligamentum flavum, which was removed with 4 and 5 mm Kerrison. The nerve roots were decompressed using undercutting technique for contralateral neural elements. The nerve root was identified and mobilized gently from lateral to medial over the disc herniation. Disc fragment was identified. It was teased free with a Vergara ball and removed.   Additional loose pieces were removed with peapod and upbiting pituitary rongeurs with a nerve retractor safely in place. A large disc herniation was identified and removed. We irrigated the disc space, and additional loose pieces were removed. The nerve was now mobile. The nerve roots were mobile without any neurocompression in that area. Some stenosis and undercutting hypertrophy in the facets was removed as well. Contralateral decompression was undertaken thus creating a bilateral decompression using undercutting technique. With undercutting technique and a Vergara ball in place, we excised ligamentum flavum through the bone at the cranial foramina as needed, facilitating a serial cranial decompression and undercutting microforaminotomy and hemilaminotomy of the L5 and S1 nerve roots. This was done in an undercutting fashion as well and done to examine the central and contralateral neural elements facilitating central bilateral decompression as well. Valsalva maneuver confirmed that there was no spinal leak. The wound was thoroughly irrigated. Hemostasis was maintained. Albert Pipes was placed over the neural elements. The fascia was reapproximated with figure-of-eight 1-0 Vicryl; 2-0 Vicryl was used for the subcutaneous tissues, and running 3-0 subcuticular stitches were applied. Steri-Strips were applied. Sterile dressings were applied. Needle and Ray-Rita counts were correct at the conclusion of the case.   The patient was extubated and taken to the recovery room in stable condition and neurologically intact on arrival.    Dictated By Abel Lainez M.D.  d: 04/27/2023 10:16:40  t: 04/27/2023 16:15:58  [facility] [id number]  KATHERIN/

## (undated) DEVICE — UNDYED BRAIDED (POLYGLACTIN 910), SYNTHETIC ABSORBABLE SUTURE: Brand: COATED VICRYL

## (undated) DEVICE — Device: Brand: INTELLICART™

## (undated) DEVICE — SOL NACL IRRIG 0.9% 1000ML BTL

## (undated) DEVICE — LIGHT HANDLE

## (undated) DEVICE — LAMINECTOMY CDS: Brand: MEDLINE INDUSTRIES, INC.

## (undated) DEVICE — 3M™ TEGADERM™ TRANSPARENT FILM DRESSING, 1626W, 4 IN X 4-3/4 IN (10 CM X 12 CM), 50 EACH/CARTON, 4 CARTON/CASE: Brand: 3M™ TEGADERM™

## (undated) DEVICE — DERMA+FLEX TISSUE ADHESIVE

## (undated) DEVICE — 1010 S-DRAPE TOWEL DRAPE 10/BX: Brand: STERI-DRAPE™

## (undated) DEVICE — FLOSEAL WITH RECOTHROM - 5ML: Brand: FLOSEAL HEMOSTATIC MATRIX

## (undated) DEVICE — STERILE POLYISOPRENE POWDER-FREE SURGICAL GLOVES: Brand: PROTEXIS

## (undated) DEVICE — SLEEVE KENDALL SCD EXPRESS MED

## (undated) DEVICE — WRAP THERAPEUTIC BACK WO GEL P

## (undated) DEVICE — SUT VICRYL 2-0 FSL J589H

## (undated) DEVICE — MEGADYNE E-Z CLEAN BLADE 4IN

## (undated) DEVICE — SUT VICRYL 1 OS-6 J535H

## (undated) DEVICE — 450 ML BOTTLE OF 0.05% CHLORHEXIDINE GLUCONATE IN 99.95% STERILE WATER FOR IRRIGATION, USP AND APPLICATOR.: Brand: IRRISEPT ANTIMICROBIAL WOUND LAVAGE

## (undated) DEVICE — 3M(TM) TEGADERM(TM) TRANSPARENT FILM DRESSING FRAME STYLE 1628: Brand: 3M™ TEGADERM™

## (undated) DEVICE — PEN SKIN MARKING REG TIP VIOLT

## (undated) DEVICE — Device

## (undated) NOTE — MR AVS SNAPSHOT
After Visit Summary   4/23/2023    Merline Negro   MRN: [id number]           Visit Information     Date & Time  4/23/2023 10:00 AM Provider  JILLIAN Duarte, Virtual Visit Dept. Phone  [phone]      Allergies as of 4/23/2023  Review status set to Review Complete on 4/20/2023   No Known Allergies     Your Current Medications        Dosage    ondansetron (ZOFRAN) 4 mg tablet Take 1 tablet (4 mg total) by mouth every 8 (eight) hours as needed. traMADol 50 MG Oral Tab Take 1 tablet (50 mg total) by mouth every 12 (twelve) hours as needed. HYDROcodone-acetaminophen 5-325 MG Oral Tab Take 1-2 tablets by mouth every 6 (six) hours as needed. Diagnoses for This Visit    Preoperative clearance   [[id number]]  -  Primary           We Ordered the Following     Future Labs/Procedures Expected by Expires    SARS-COV-2 BY PCR [place]) [[id number] CUSTOM]  4/23/2023 (Approximate) 4/23/2024                Did you know that [facility] primary care physicians now offer Video Visits through [address] for adult patients for a variety of conditions such as allergies, back pain and cold symptoms? Skip the drive and waiting room and online chat with a doctor face-to-face using your web-cam enabled computer or mobile device wherever you are. Video Visits cost $50 and can be paid hassle-free using a credit, debit, or health savings card. Not active on [employer]? Ask us how to get signed up today! If you receive a survey from [employer], please take a few minutes to complete it and provide feedback. We strive to deliver the best patient experience and are looking for ways to make improvements. Your feedback will help us do so. For more information on [employer], please visit [email or web address]           No text in SmartText           No text in SmartText

## (undated) NOTE — LETTER
OUTSIDE TESTING RESULT REQUEST     IMPORTANT: FOR YOUR IMMEDIATE ATTENTION  Please FAX all test results listed below to: [phone]     Testing already done on or about: 23    * * * * If testing is NOT complete, arrange with patient A.S.A.P. * * * *      Patient Name: [facility]  Surgery Date: 2023  Medical Record: [id number]  CSN: [id number]  : 10/5/1990 - A: 27 y     Sex: male  Surgeon(s):  Shaun Garcia MD  Procedure: MICRODISCECTOMY LUMBAR 5 - SACRUM 1  Anesthesia Type: General     Surgeon: Shaun Garcia MD     The following Testing and Time Line are REQUIRED PER ANESTHESIA     EKG READ AND SIGNED WITHIN   90 days  CBC [with Differential & Platelets] within  90 days  CMP (requires 4 hour fast) within  90 days  PT/INR within  30 days  PTT within  30 days  UA with Reflex to Culture within  14 days  MSSA/MRSA Nasal screening within 30 days      Thank You,   Sent by:JENNIFER Brown - Pre-Admission Testing

## (undated) NOTE — LETTER
Patient Name: Mikel Marrfuo / Sex: 10/5/1990-A: 27 y  male   Medical Records: [id number] CSN: [id number]    ABNORMAL VALUES  Surgeon(s):  Romelia Quevedo MD  Anesthesia Type: General  Date of Surgery: 4/26/2023  Procedure Description: MICRODISCECTOMY LUMBAR 5 - SACRUM 1  Primary Surgeon:  Romelia Quevedo MD  Phone Number: [phone]    PLEASE NOTE THE FOLLOWING ABNORMALITIES:   Other   MSSA: positive  ________________________________________________________